# Patient Record
Sex: MALE | HISPANIC OR LATINO
[De-identification: names, ages, dates, MRNs, and addresses within clinical notes are randomized per-mention and may not be internally consistent; named-entity substitution may affect disease eponyms.]

---

## 2024-08-16 PROBLEM — Z00.00 ENCOUNTER FOR PREVENTIVE HEALTH EXAMINATION: Status: ACTIVE | Noted: 2024-08-16

## 2024-08-19 ENCOUNTER — APPOINTMENT (OUTPATIENT)
Dept: PEDIATRIC ORTHOPEDIC SURGERY | Facility: CLINIC | Age: 29
End: 2024-08-19
Payer: COMMERCIAL

## 2024-08-19 VITALS — HEIGHT: 66 IN | BODY MASS INDEX: 26.68 KG/M2 | WEIGHT: 166 LBS | TEMPERATURE: 96.6 F

## 2024-08-19 DIAGNOSIS — M79.18 MYALGIA, OTHER SITE: ICD-10-CM

## 2024-08-19 PROCEDURE — 72100 X-RAY EXAM L-S SPINE 2/3 VWS: CPT

## 2024-08-19 PROCEDURE — 99202 OFFICE O/P NEW SF 15 MIN: CPT

## 2024-08-19 RX ORDER — MELOXICAM 15 MG/1
15 TABLET ORAL
Qty: 30 | Refills: 1 | Status: ACTIVE | COMMUNITY
Start: 2024-08-19 | End: 1900-01-01

## 2024-08-19 NOTE — PHYSICAL EXAM
[de-identified] : Exam today reveals very well-developed torso and lower extremities secondary to obvious weight lifting.  He has a straight spine level pelvis and a normal gait.  Full motion to the entire spine is noted in all planes he has mild spasm only on today's visit on both sides of the midline in the lumbar segment no trigger points present.  Both lower extremities are symmetric in appearance well muscled.  He has supple motion to all joints symmetrically.  Straight leg raising to 75 degrees is negative he is neurologically intact.  X-rays ordered and taken today of the lumbar spine to include the thoracic reveal no obvious abnormalities present

## 2024-08-19 NOTE — ASSESSMENT
[FreeTextEntry1] : Impression: Myofascial pain chronic.  I have advised restricting the aggressive weight lifting.  Formal physical therapy has been prescribed along with Mobic with GI precautions.  He will return at the conclusion of therapy if he still is symptomatic.  There is no indication to pursue an MRI at this time

## 2024-08-19 NOTE — HISTORY OF PRESENT ILLNESS
[de-identified] : This 28-year-old healthy young man is seen for evaluation of longstanding back pain.  Patient states he has had low back pain ever since he was a child with no obvious history of trauma precipitating event.  When he does have episodes of pain it does not radiate into either lower extremity no associated numbness paresthesias motor weakness bladder or bowel dysfunction.  When he does have pain it is on the order of 1-2 hours.  He was seen at [facility] just recently because of severe pain and he was told he may need an MRI.  It is to be noted he has not had any treatment for this.  When he does not have pain he functions well.  He does lift weights aggressively.

## 2024-08-19 NOTE — CONSULT LETTER
[Dear  ___] : Dear  [unfilled], [Consult Letter:] : I had the pleasure of evaluating your patient, [unfilled]. [Please see my note below.] : Please see my note below. [Consult Closing:] : Thank you very much for allowing me to participate in the care of this patient.  If you have any questions, please do not hesitate to contact me. [FreeTextEntry3] : Dr Luis Eduardo Ny JR.